# Patient Record
Sex: FEMALE | Race: OTHER
[De-identification: names, ages, dates, MRNs, and addresses within clinical notes are randomized per-mention and may not be internally consistent; named-entity substitution may affect disease eponyms.]

---

## 2019-08-13 ENCOUNTER — HOSPITAL ENCOUNTER (INPATIENT)
Dept: HOSPITAL 53 - M LDI | Age: 34
LOS: 2 days | Discharge: HOME | DRG: 540 | End: 2019-08-15
Payer: COMMERCIAL

## 2019-08-13 VITALS — SYSTOLIC BLOOD PRESSURE: 108 MMHG | DIASTOLIC BLOOD PRESSURE: 53 MMHG

## 2019-08-13 VITALS — SYSTOLIC BLOOD PRESSURE: 107 MMHG | DIASTOLIC BLOOD PRESSURE: 70 MMHG

## 2019-08-13 VITALS — SYSTOLIC BLOOD PRESSURE: 117 MMHG | DIASTOLIC BLOOD PRESSURE: 75 MMHG

## 2019-08-13 VITALS — SYSTOLIC BLOOD PRESSURE: 104 MMHG | DIASTOLIC BLOOD PRESSURE: 56 MMHG

## 2019-08-13 VITALS — HEIGHT: 60 IN | BODY MASS INDEX: 37.69 KG/M2 | WEIGHT: 192 LBS

## 2019-08-13 VITALS — SYSTOLIC BLOOD PRESSURE: 119 MMHG | DIASTOLIC BLOOD PRESSURE: 63 MMHG

## 2019-08-13 VITALS — DIASTOLIC BLOOD PRESSURE: 66 MMHG | SYSTOLIC BLOOD PRESSURE: 123 MMHG

## 2019-08-13 VITALS — SYSTOLIC BLOOD PRESSURE: 114 MMHG | DIASTOLIC BLOOD PRESSURE: 63 MMHG

## 2019-08-13 VITALS — DIASTOLIC BLOOD PRESSURE: 73 MMHG | SYSTOLIC BLOOD PRESSURE: 111 MMHG

## 2019-08-13 DIAGNOSIS — Z3A.39: ICD-10-CM

## 2019-08-13 DIAGNOSIS — O34.211: Primary | ICD-10-CM

## 2019-08-13 LAB
HCT VFR BLD AUTO: 36.1 % (ref 36–47)
HGB BLD-MCNC: 12.9 G/DL (ref 12–15.5)
MCH RBC QN AUTO: 31.9 PG (ref 27–33)
MCHC RBC AUTO-ENTMCNC: 35.7 G/DL (ref 32–36.5)
MCV RBC AUTO: 89.1 FL (ref 80–96)
PLATELET # BLD AUTO: 175 10^3/UL (ref 150–450)
RBC # BLD AUTO: 4.05 10^6/UL (ref 4–5.4)
WBC # BLD AUTO: 7.1 10^3/UL (ref 4–10)

## 2019-08-13 RX ADMIN — KETOROLAC TROMETHAMINE SCH MG: 30 INJECTION, SOLUTION INTRAMUSCULAR at 21:02

## 2019-08-13 RX ADMIN — KETOROLAC TROMETHAMINE SCH MG: 30 INJECTION, SOLUTION INTRAMUSCULAR at 14:57

## 2019-08-13 RX ADMIN — SODIUM CHLORIDE, POTASSIUM CHLORIDE, SODIUM LACTATE AND CALCIUM CHLORIDE SCH MLS/HR: 600; 310; 30; 20 INJECTION, SOLUTION INTRAVENOUS at 16:58

## 2019-08-13 NOTE — RO
DATE OF PROCEDURE:  2019

 

PREPROCEDURE DIAGNOSIS:  39 weeks, prior  section times two.

 

POSTPROCEDURE DIAGNOSIS:  39 weeks, prior  section times two.

 

PROCEDURE:  Repeat low transverse  section.

 

SURGEON:  Fly Hare MD

 

ASSISTANT:  Mary Tan CNM

 

ANESTHESIA:  Spinal.

 

ESTIMATED BLOOD LOSS: 500 mL.

 

URINE OUTPUT:  150 mL.

 

FINDINGS: 7 pound 4 ounce, 3300 gram female infant. Apgar scores 9 and 9. Vertex

position. Lower uterine segment was noted to be quite thin. Otherwise, normal

uterus, fallopian tubes, ovaries.

 

DESCRIPTION OF PROCEDURE:  The patient was taken to the operating room where

spinal anesthesia was induced. She was prepped and draped in a sterile fashion 
in

the supine position. A Carter catheter was placed. A Pfannenstiel skin incision

was made with a scalpel and carried through to the fascia. The fascia was nicked

and extended. The fascia was dissected off the rectus muscles. The rectus 
muscles

were divided. The peritoneal cavity was entered. A bladder flap was created.

Curvilinear incision was made in the lower uterine segment until clear fluid was

noted. This was extended manually. The infant was delivered with a single use of

the vacuum extractor without difficulty. The cord was doubly clamped and cut. 
The

infant was handed off to the awaiting nurse. The placenta was expressed. The

uterus was exteriorized and cleared of clots and debris. The uterine incision 
was

closed with #0 Vicryl in a running locked fashion. Several figure-of-8 sutures

were placed for hemostasis. The uterus was placed back in the abdominal cavity.

The peritoneum was closed with #2-0 Vicryl in a running fashion. The fascia was

closed with #0 Vicryl in a running fashion. The deep layer was irrigated and

closed with #3-0 chromic. The skin was closed with #4-0 Monocryl subcuticular

sutures. Sponge, instrument and needle counts were correct.



   Mary Burnham CNM assisted with all aspects of the procedure. She helped 
create

all layers of the abdomen. She helped expel the fetus, and close all subsequent 
layers.

[facility]

## 2019-08-14 VITALS — SYSTOLIC BLOOD PRESSURE: 98 MMHG | DIASTOLIC BLOOD PRESSURE: 50 MMHG

## 2019-08-14 VITALS — DIASTOLIC BLOOD PRESSURE: 51 MMHG | SYSTOLIC BLOOD PRESSURE: 93 MMHG

## 2019-08-14 VITALS — DIASTOLIC BLOOD PRESSURE: 66 MMHG | SYSTOLIC BLOOD PRESSURE: 115 MMHG

## 2019-08-14 VITALS — SYSTOLIC BLOOD PRESSURE: 100 MMHG | DIASTOLIC BLOOD PRESSURE: 58 MMHG

## 2019-08-14 VITALS — SYSTOLIC BLOOD PRESSURE: 106 MMHG | DIASTOLIC BLOOD PRESSURE: 55 MMHG

## 2019-08-14 VITALS — DIASTOLIC BLOOD PRESSURE: 67 MMHG | SYSTOLIC BLOOD PRESSURE: 112 MMHG

## 2019-08-14 LAB
HCT VFR BLD AUTO: 30 % (ref 36–47)
HGB BLD-MCNC: 10.5 G/DL (ref 12–15.5)
MCH RBC QN AUTO: 31.8 PG (ref 27–33)
MCHC RBC AUTO-ENTMCNC: 35 G/DL (ref 32–36.5)
MCV RBC AUTO: 90.9 FL (ref 80–96)
PLATELET # BLD AUTO: 157 10^3/UL (ref 150–450)
RBC # BLD AUTO: 3.3 10^6/UL (ref 4–5.4)
WBC # BLD AUTO: 8.5 10^3/UL (ref 4–10)

## 2019-08-14 RX ADMIN — IBUPROFEN PRN MG: 600 TABLET, FILM COATED ORAL at 19:59

## 2019-08-14 RX ADMIN — KETOROLAC TROMETHAMINE SCH MG: 30 INJECTION, SOLUTION INTRAMUSCULAR at 03:00

## 2019-08-14 RX ADMIN — SODIUM CHLORIDE, POTASSIUM CHLORIDE, SODIUM LACTATE AND CALCIUM CHLORIDE SCH MLS/HR: 600; 310; 30; 20 INJECTION, SOLUTION INTRAVENOUS at 01:24

## 2019-08-14 RX ADMIN — Medication SCH TAB: at 08:04

## 2019-08-14 RX ADMIN — IBUPROFEN PRN MG: 600 TABLET, FILM COATED ORAL at 13:52

## 2019-08-15 VITALS — DIASTOLIC BLOOD PRESSURE: 62 MMHG | SYSTOLIC BLOOD PRESSURE: 100 MMHG

## 2019-08-15 VITALS — DIASTOLIC BLOOD PRESSURE: 55 MMHG | SYSTOLIC BLOOD PRESSURE: 93 MMHG

## 2019-08-15 RX ADMIN — IBUPROFEN PRN MG: 600 TABLET, FILM COATED ORAL at 07:39

## 2019-08-15 RX ADMIN — Medication SCH TAB: at 07:38

## 2019-08-15 NOTE — DSES
DATE OF ADMISSION:  2019

DATE OF DISCHARGE:

 

DISCHARGE DIAGNOSIS:  Repeat  section at 39 weeks, postoperative day #2,

stable.

 

SURGEON:  Dr. Fly Hare

 

ASSISTANT:  Mary Tan, Certified Nurse Midwife.

 

HISTORY:  Maude is a 33-year-old,  3, para 3-0-0-3 now, who was admitted to

labor and delivery for repeat  section at term.  The surgery was

uncomplicated.  Estimated blood loss 500 mL.  She delivered a live female infant

7 pounds 4 ounces, 3300 grams, Apgars 9 and 9.  Her postoperative course has been

uncomplicated.  She has been out of bed for self care, ramya care and infant care.

She is breast-feeding.  Her pain has been well controlled with by mouth pain

medications.  She is voiding without difficulty and passing flatus.

 

OBJECTIVE:  Temperature 98.6, pulse 76, respirations 18, /62.  Preoperative

CBC on 2019 with hemoglobin 12.9, hematocrit 36.1, platelets 175.

Postoperative CBC on 2019 with hemoglobin 10.5, hematocrit 30.0, platelets

157.  Breasts are soft, nontender.  Nipples are intact.  Abdomen:  Fundus firm at

one fingerbreadth below umbilicus.  Incision dressing is applied.  There is no

drainage noted.  Perineum is intact.  Lochia rubra scant.

 

PLAN:  Discharge the patient home today.  She is to follow up at [facility] for a 2-week incision check and an 8-week postpartum visit.  I did

review discharge instructions that include breast care, incision care, ramya care,

pelvic rest, activity and lifting restrictions, danger signs to report and access

to her provider.  Prescriptions have been E-prescribed by Dr. Fly Hare to the

patient's pharmacy for Percocet 5/325 one to two tablets by mouth every 4 hours

as needed for pain.  The patient has had all her questions answered and desires

to be discharged home today.